# Patient Record
Sex: MALE | Race: WHITE | NOT HISPANIC OR LATINO | Employment: FULL TIME | URBAN - METROPOLITAN AREA
[De-identification: names, ages, dates, MRNs, and addresses within clinical notes are randomized per-mention and may not be internally consistent; named-entity substitution may affect disease eponyms.]

---

## 2017-01-31 RX ORDER — DEXTROAMPHETAMINE SACCHARATE, AMPHETAMINE ASPARTATE, DEXTROAMPHETAMINE SULFATE AND AMPHETAMINE SULFATE 3.75; 3.75; 3.75; 3.75 MG/1; MG/1; MG/1; MG/1
15 TABLET ORAL DAILY
Qty: 30 TABLET | Refills: 0 | Status: SHIPPED | OUTPATIENT
Start: 2017-01-31 | End: 2017-02-20 | Stop reason: SDUPTHER

## 2017-01-31 NOTE — TELEPHONE ENCOUNTER
----- Message from Whit Bright sent at 1/31/2017 10:21 AM CST -----  Contact: Call Pt [phone]  RX request - refill or new RX.  Is this a refill or new RX:  New  RX name and strength: Adderall   Directions: 1 T Q D  Is this a 30 day or 90 day RX:  30  Pharmacy name and phone : [facility] [phone]   [facility]

## 2017-02-20 RX ORDER — DEXTROAMPHETAMINE SACCHARATE, AMPHETAMINE ASPARTATE, DEXTROAMPHETAMINE SULFATE AND AMPHETAMINE SULFATE 3.75; 3.75; 3.75; 3.75 MG/1; MG/1; MG/1; MG/1
15 TABLET ORAL DAILY
Qty: 30 TABLET | Refills: 0 | Status: SHIPPED | OUTPATIENT
Start: 2017-02-20 | End: 2017-03-28 | Stop reason: SDUPTHER

## 2017-02-21 NOTE — TELEPHONE ENCOUNTER
Patient states he'll be leaving town for [place] and is requesting to have his Adderall filled early.  Please advise.

## 2017-02-21 NOTE — TELEPHONE ENCOUNTER
Refill called in yesterday.  However, patient is too early to get a refill from the pharmacy.  His insurance isn't going to cover it.

## 2017-02-23 ENCOUNTER — HOSPITAL ENCOUNTER (EMERGENCY)
Facility: HOSPITAL | Age: 27
Discharge: HOME | End: 2017-02-23
Attending: EMERGENCY MEDICINE

## 2017-02-23 VITALS
BODY MASS INDEX: 21.98 KG/M2 | OXYGEN SATURATION: 100 % | DIASTOLIC BLOOD PRESSURE: 74 MMHG | TEMPERATURE: 99 F | RESPIRATION RATE: 12 BRPM | HEART RATE: 90 BPM | WEIGHT: 145 LBS | HEIGHT: 68 IN | SYSTOLIC BLOOD PRESSURE: 130 MMHG

## 2017-02-23 DIAGNOSIS — R05.9 COUGH: ICD-10-CM

## 2017-02-23 DIAGNOSIS — J01.90 ACUTE SINUSITIS, UNSPECIFIED: Primary | ICD-10-CM

## 2017-02-23 PROCEDURE — 99283 EMERGENCY DEPT VISIT LOW MDM: CPT

## 2017-02-23 RX ORDER — GUAIFENESIN/DEXTROMETHORPHAN 100-10MG/5
5 SYRUP ORAL 4 TIMES DAILY PRN
Qty: 120 ML | Refills: 0 | Status: SHIPPED | OUTPATIENT
Start: 2017-02-23 | End: 2017-03-05

## 2017-02-23 RX ORDER — AZITHROMYCIN 250 MG/1
250 TABLET, FILM COATED ORAL DAILY
Qty: 6 TABLET | Refills: 0 | Status: SHIPPED | OUTPATIENT
Start: 2017-02-23 | End: 2017-03-05

## 2017-02-23 NOTE — ED AVS SNAPSHOT
[facility] [address] [place] [address]               Renan Jackson Jr.   2017  5:15 PM   ED    Description:  Male : 1990   Department:  [facility]           Your Care was Coordinated By:     Provider Role From To    Micky Powell MD Attending Provider [phone] --    Lilian Rueda PA-C Physician Assistant 17 [phone] --      Reason for Visit     Cough           Diagnoses this Visit        Comments    Acute sinusitis, unspecified    -  Primary     Cough           ED Disposition     ED Disposition Condition Comment    Discharge             To Do List           Follow-up Information     Follow up with George Salazar MD, MD In 1 week.    Specialty:  Internal Medicine    Contact information:     [facility] [address]  [phone]          Follow up with [facility].    Specialty:  Emergency Medicine    Why:  If symptoms worsen    Contact information:    [address] [place] [id number] [phone]       These Medications        Disp Refills Start End    dextromethorphan-guaifenesin  mg/5 ml (ROBITUSSIN-DM)  mg/5 mL liquid 120 mL 0 2017 3/5/2017    Take 5 mLs by mouth 4 (four) times daily as needed (cough). - Oral    [place] [facility] [place] [address] Ph #: [phone]       azithromycin (Z-GREGORY) 250 MG tablet 6 tablet 0 2017 3/5/2017    Take 1 tablet (250 mg total) by mouth once daily. Take first 2 tablets together, then 1 every day until finished. - Oral    [place]arm[place] [facility] [place] [address] Ph #: [phone]         [facility]     [facility] -  Assistance  Registered nurses in the [facility] provide clinical advisement, health education, appointment booking, and other advisory  "services.  Call for this free service at [phone].             Medications           Message regarding Medications     Verify the changes and/or additions to your medication regime listed below are the same as discussed with your clinician today.  If any of these changes or additions are incorrect, please notify your healthcare provider.        START taking these NEW medications        Refills    dextromethorphan-guaifenesin  mg/5 ml (ROBITUSSIN-DM)  mg/5 mL liquid 0    Sig: Take 5 mLs by mouth 4 (four) times daily as needed (cough).    Class: Print    Route: Oral    azithromycin (Z-GREGORY) 250 MG tablet 0    Sig: Take 1 tablet (250 mg total) by mouth once daily. Take first 2 tablets together, then 1 every day until finished.    Class: Print    Route: Oral      STOP taking these medications     buPROPion (WELLBUTRIN XL) 150 MG TB24 tablet Take 1 tablet (150 mg total) by mouth once daily.           Verify that the below list of medications is an accurate representation of the medications you are currently taking.  If none reported, the list may be blank. If incorrect, please contact your healthcare provider. Carry this list with you in case of emergency.           Current Medications     dextroamphetamine-amphetamine (ADDERALL) 15 mg tablet Take 1 tablet (15 mg total) by mouth once daily.    azithromycin (Z-GREGORY) 250 MG tablet Take 1 tablet (250 mg total) by mouth once daily. Take first 2 tablets together, then 1 every day until finished.    dextromethorphan-guaifenesin  mg/5 ml (ROBITUSSIN-DM)  mg/5 mL liquid Take 5 mLs by mouth 4 (four) times daily as needed (cough).           Clinical Reference Information           Your Vitals Were     BP Pulse Temp Resp Height Weight    130/74 (BP Location: Right arm, Patient Position: Sitting) 90 98.5 °F (36.9 °C) (Oral) 12 5' 8" (1.727 m) 65.8 kg (145 lb)    SpO2 BMI             100% 22.05 kg/m2         Allergies as of 2/23/2017     No Known Allergies "      Immunizations Administered on Date of Encounter - 2/23/2017     None      ED Micro, Lab, POCT     Start Ordered       Status Ordering Provider    02/23/17 1819 02/23/17 1818  Rapid strep screen  STAT      In process     02/23/17 1819 02/23/17 1818  Influenza antigen Nasopharyngeal Swab  STAT      In process       ED Imaging Orders     Start Ordered       Status Ordering Provider    02/23/17 1819 02/23/17 1818  X-Ray Chest PA And Lateral  1 time imaging      In process         Discharge Instructions       Follow up with your primary care provider for further management of your allergies and sinus infection.      Discharge References/Attachments     SINUSITIS (ANTIBIOTIC TREATMENT) (ENGLISH)      Smoking Cessation     If you would like to quit smoking:   You may be eligible for free services if you are a [place] resident and started smoking cigarettes before September 1, 1988.  Call the Smoking Cessation [employer] ([facility]) toll free at ([phone] or ([phone] 638-3676.   Call 6-552-QUIT-NOW if you do not meet the above criteria.             [facility] complies with applicable Federal civil rights laws and does not discriminate on the basis of race, color, national origin, age, disability, or sex.        Language Assistance Services     ATTENTION: Language assistance services are available, free of charge. Please call [phone].      ATENCIÓN: Si habla español, tiene a ledbetter disposición servicios gratuitos de asistencia lingüística. Llame al [phone].     CHÚ Ý: N?u b?n nói Ti?ng Vi?t, có các d?ch v? h? tr? ngôn ng? mi?n phí dành cho b?n. G?i s? [phone].

## 2017-02-24 NOTE — ED PROVIDER NOTES
Encounter Date: 2/23/2017       History     Chief Complaint   Patient presents with    Cough     sinus congestion x 1 week     Review of patient's allergies indicates:  No Known Allergies  HPI Comments: Patient is a 26 year old male with complaint of cough and sinus congestion. He denied PMH. Reports he smokes a pack of cigarettes a day. He states he has been taking dayquil and Nyquil without significant improvement. He reports associated sinus pressure, rhinorrhea and headache. He reports he had a fever last week which has resolved. He denied sore throat, shortness of breath, nausea, vomiting, diarrhea or abdominal pain.     The history is provided by the patient and a relative.     Past Medical History:   Diagnosis Date    ADHD (attention deficit hyperactivity disorder)     Anxiety     used to have panic attacks     Past Surgical History:   Procedure Laterality Date    WISDOM TOOTH EXTRACTION       Family History   Problem Relation Age of Onset    Cancer Maternal Uncle      unsure of the cancer - pelvic    Cancer Maternal Grandfather      rare blood cancer    Hypertension Paternal Grandfather     Colon cancer Neg Hx     Prostate cancer Neg Hx     Stroke Neg Hx     Diabetes Neg Hx     Hyperlipidemia Neg Hx     Heart disease Neg Hx      Social History   Substance Use Topics    Smoking status: Current Every Day Smoker     Packs/day: 0.50    Smokeless tobacco: Never Used      Comment: smokes 1/2 ppd. Trying to quit.    Alcohol use 0.0 oz/week     0 Standard drinks or equivalent per week      Comment: once or twice a month - 4-5 beers at a time     Review of Systems   Constitutional: Positive for fever (Resolved).   HENT: Positive for congestion, rhinorrhea and sinus pressure. Negative for sore throat.    Respiratory: Positive for cough. Negative for shortness of breath.    Cardiovascular: Negative for chest pain.   Gastrointestinal: Negative for abdominal pain, diarrhea, nausea and vomiting.    Genitourinary: Negative for dysuria.   Musculoskeletal: Negative for back pain, neck pain and neck stiffness.   Skin: Negative for rash.   Neurological: Positive for headaches. Negative for dizziness and weakness.   Hematological: Does not bruise/bleed easily.       Physical Exam   Initial Vitals   BP Pulse Resp Temp SpO2   02/23/17 1659 02/23/17 1659 02/23/17 1659 02/23/17 1659 02/23/17 1659   130/74 90 12 98.5 °F (36.9 °C) 100 %     Physical Exam    Nursing note and vitals reviewed.  Constitutional: He appears well-developed and well-nourished.   HENT:   Head: Normocephalic and atraumatic.   Right Ear: Tympanic membrane, external ear and ear canal normal.   Left Ear: Tympanic membrane, external ear and ear canal normal.   Nose: Rhinorrhea present. Right sinus exhibits maxillary sinus tenderness and frontal sinus tenderness. Left sinus exhibits maxillary sinus tenderness and frontal sinus tenderness.   Mouth/Throat: Uvula is midline. Posterior oropharyngeal erythema present.   Bilateral tonsillar swelling and erythema with exudate noted.   Eyes: Conjunctivae are normal. Right eye exhibits no discharge. Left eye exhibits no discharge. No scleral icterus.   Neck: Normal range of motion and full passive range of motion without pain. Neck supple.   Cardiovascular: Normal rate, regular rhythm and normal heart sounds. Exam reveals no gallop and no friction rub.    No murmur heard.  Pulmonary/Chest: Effort normal and breath sounds normal. He has no decreased breath sounds. He has no wheezes. He has no rhonchi. He has no rales.   Abdominal: Soft. Normal appearance and bowel sounds are normal. He exhibits no distension. There is no tenderness.   Musculoskeletal: Normal range of motion. He exhibits no tenderness.   Neurological: He is oriented to person, place, and time.   Skin: Skin is warm and dry.         ED Course   Procedures  Labs Reviewed - No data to display          Medical Decision Making:   History:   I obtained  history from: someone other than patient.  Old Medical Records: I decided to obtain old medical records.  Clinical Tests:   Radiological Study: Ordered       APC / Resident Notes:   This is an emergent evaluation of a 26 year old male with complaint of congestion, rhinorrhea, cough and headache  The report attempted treatment with multiple over the counter medications. Patient reported fever that hs resolved.  No abdominal pain, nausea or vomiting.  Vital signs are stable.  Patient is afebrile.  Abdomen is soft and nontender.  There is no rebound, rigidity or distention.  I doubt intra-abdominal process.  Bilateral TMs with no erythema, retraction or perforation.  There is no mastoid tenderness.  There is no movement tenderness to bilateral ears.  Bilateral tonsillar swelling with exudate noted.  Uvula is midline.  No concern for ludwigs angina. Breath sounds are clear. Patient has adamantly refused influenza test and strep testing. He keeps demanding I just prescribe his medications so that he can leave. I explained that he has to have his CXR prior to discharge. Workup is negative. Due to length of symptoms and failed over the counter medications, will treat with azithromycin. Discussed results with patient. Return precautions given. Patient is to follow up with their primary care provider. Case was discussed with Dr. Powell who is in agreement with the plan of care. All questions answered.            Attending Attestation:     Physician Attestation Statement for NP/PA:   I discussed this assessment and plan of this patient with the NP/PA, but I did not personally examine the patient. The face to face encounter was performed by the NP/PA.    Other NP/PA Attestation Additions:    History of Present Illness: 26-year-old male presented with a chief complaint of cough and congestion.    Medical Decision Making: Initial differential diagnosis included but not limited to pneumonia, bronchitis, and upper respiratory  infection.  The patient's x-ray showed no acute abnormalities per my independent interpretation.  I am in agreement with the physician assistant's  assessment, treatment, and plan of care.                 ED Course     Clinical Impression:   The primary encounter diagnosis was Acute sinusitis, unspecified. A diagnosis of Cough was also pertinent to this visit.    Disposition:   Disposition: Discharged  Condition: Stable       Lilian Rueda PA-C  02/24/17 [id number]       Micky Powell MD  02/24/17 7941

## 2017-02-24 NOTE — DISCHARGE INSTRUCTIONS
Follow up with your primary care provider for further management of your allergies and sinus infection.

## 2017-02-24 NOTE — ED NOTES
Pt and mother upset and state that pt wants an antibiotic and let him go home. PA in room to talk to pt and family

## 2017-02-24 NOTE — ED NOTES
"Pt states cough and congest for the past week and states "I have a sinus infection" alert calm even non labored respirations mother remains at bedside. Aware to notify nurse of needs or concerns.   "

## 2017-02-24 NOTE — ED NOTES
Pt refusing flu and strep swab. Explained reasoning for test. Pt still refuses. States he just wants a zpak and to go home. PA aware.

## 2017-03-28 RX ORDER — DEXTROAMPHETAMINE SACCHARATE, AMPHETAMINE ASPARTATE, DEXTROAMPHETAMINE SULFATE AND AMPHETAMINE SULFATE 3.75; 3.75; 3.75; 3.75 MG/1; MG/1; MG/1; MG/1
15 TABLET ORAL DAILY
Qty: 30 TABLET | Refills: 0 | Status: SHIPPED | OUTPATIENT
Start: 2017-03-28 | End: 2017-04-27 | Stop reason: SDUPTHER

## 2017-03-28 NOTE — TELEPHONE ENCOUNTER
----- Message from Seth Matias sent at 3/28/2017  1:53 PM CDT -----  Contact: Self [phone]  RX request - refill or new RX.  Is this a refill or new RX:  Refill  RX name and strength: dextroamphetamine-amphetamine (ADDERALL) 15 mg   Directions:   Is this a 30 day or 90 day RX:    Pharmacy name and phone #:  [facility]  [phone]  Comments:

## 2017-04-27 RX ORDER — DEXTROAMPHETAMINE SACCHARATE, AMPHETAMINE ASPARTATE, DEXTROAMPHETAMINE SULFATE AND AMPHETAMINE SULFATE 3.75; 3.75; 3.75; 3.75 MG/1; MG/1; MG/1; MG/1
15 TABLET ORAL DAILY
Qty: 30 TABLET | Refills: 0 | Status: SHIPPED | OUTPATIENT
Start: 2017-04-27

## 2017-04-27 NOTE — TELEPHONE ENCOUNTER
----- Message from Parish Marrero sent at 4/27/2017  9:30 AM CDT -----  Contact: Pt at [phone]  RX request - refill or new RX.  Is this a refill or new RX:  refill  RX name and strength: dextroamphetamine-amphetamine (ADDERALL) 15 mg tablet  Directions: Take 1 tablet (15 mg total) by mouth once daily. - Oral  Is this a 30 day or 90 day RX:  30 tablet  Pharmacy name and phone #: [facility]  [phone] (Phone)  [phone] (Fax)  Comments:

## 2019-11-19 NOTE — TELEPHONE ENCOUNTER
----- Message from Abdon Gomez sent at 2/20/2017 12:06 PM CST -----  Contact: self   RX request - refill or new RX.  Is this a refill or new RX:  Refill   RX name and strength: dextroamphetamine-amphetamine (ADDERALL) 15 mg tablet  Directions: Take 1 tablet (15 mg total) by mouth once daily.   Is this a 30 day or 90 day RX:  30  Pharmacy name and phone #: [facility]   [phone] (Phone)  [phone] (Fax)  Comments:            Yes